# Patient Record
Sex: MALE | Race: BLACK OR AFRICAN AMERICAN | NOT HISPANIC OR LATINO | URBAN - METROPOLITAN AREA
[De-identification: names, ages, dates, MRNs, and addresses within clinical notes are randomized per-mention and may not be internally consistent; named-entity substitution may affect disease eponyms.]

---

## 2023-11-22 ENCOUNTER — OFFICE (OUTPATIENT)
Dept: URBAN - METROPOLITAN AREA CLINIC 26 | Facility: CLINIC | Age: 43
End: 2023-11-22

## 2024-03-14 ENCOUNTER — OFFICE VISIT (OUTPATIENT)
Dept: GASTROENTEROLOGY | Facility: CLINIC | Age: 44
End: 2024-03-14
Payer: COMMERCIAL

## 2024-03-14 VITALS
SYSTOLIC BLOOD PRESSURE: 125 MMHG | HEART RATE: 77 BPM | BODY MASS INDEX: 31.14 KG/M2 | HEIGHT: 70 IN | TEMPERATURE: 98.2 F | DIASTOLIC BLOOD PRESSURE: 78 MMHG | WEIGHT: 217.5 LBS

## 2024-03-14 DIAGNOSIS — B19.10 UNSPECIFIED VIRAL HEPATITIS B WITHOUT HEPATIC COMA: ICD-10-CM

## 2024-03-14 DIAGNOSIS — B18.1 CHRONIC HEPATITIS B VIRUS INFECTION (MULTI): Primary | ICD-10-CM

## 2024-03-14 DIAGNOSIS — Z76.89 ENCOUNTER TO ESTABLISH CARE WITH NEW DOCTOR: ICD-10-CM

## 2024-03-14 PROCEDURE — 99214 OFFICE O/P EST MOD 30 MIN: CPT | Performed by: INTERNAL MEDICINE

## 2024-03-14 PROCEDURE — 1036F TOBACCO NON-USER: CPT | Performed by: INTERNAL MEDICINE

## 2024-03-14 PROCEDURE — 99204 OFFICE O/P NEW MOD 45 MIN: CPT | Performed by: INTERNAL MEDICINE

## 2024-03-14 RX ORDER — OMEPRAZOLE 20 MG/1
20 CAPSULE, DELAYED RELEASE ORAL
COMMUNITY

## 2024-03-14 RX ORDER — CETIRIZINE HYDROCHLORIDE 10 MG/1
TABLET ORAL
COMMUNITY

## 2024-03-14 ASSESSMENT — PAIN SCALES - GENERAL: PAINLEVEL: 0-NO PAIN

## 2024-03-14 NOTE — PATIENT INSTRUCTIONS
Welcome to Dr. [facility]el Lina[facility].  Dr. Linares sees patients at the following sites:  [place] [address] Liver/GI Clinic at [facility] [address], [address] at [facility] at [facility], [facility] [address]    Dr. Linares's hepatology care coordinator, Elsa LO, can be reached at [phone].  Dr. Linares's , Clara Hutchinson, can be reached at [phone].    Lets try to send prior records     Labwork - hepatitis B tests    Liver US    Fibroscan

## 2024-03-14 NOTE — PROGRESS NOTES
Department of Gastroenterology and Hepatology   Clinic Note (NEW patient)     HPI  Tyrone Andino is a 43 y.o. male with PMH of hepatitis B (Dx ~20 year ago) who presents to liver clinic today for hepatitis B.     Patient states he immig[place] from [facility] [place] to the [place] at age of 20. Patient just removed from [facility] to [place] about 3 years ago.     Patient states he tested positive for hepatitis B in [place], [place] about 20 years ago. He was told that due to low DNA level and normal LFTs, he did not need Hep B treatment; he also recalls being tested for genotype and was told it is a low risk strain. At one point he was diagnosed with fatty liver and improved after dietary changes.     He moved to [facility] in 2011 and was followed by a hepatologist in [place]; he states his last FibroScan was about 3 years ago and was told it was normal.     He has not seen a hepatologist the past 2-3 years since moving back to [place]. Patient is asymptomatic from liver standpoint--he denies nausea, vomiting, scleral icterus, abdominal distention/pain, overt GI bleeding.    Labs 2/2024; WBC 7.1, hemoglobin 14.9 with MCV 71 and high eosinophil% (pending hematology appointment), platelet 292, platelet 292, AST 28, ALT 31, alk phos 58, T. bili 0.5, albumin 4.5.    Patient lives with his wife and 4 children; he states all of them were immunized for hep B.  He denies any known family history of hep B infection.    Prior GI procedures (no reports available for review)   Colonoscopy 2016/2017 in [place] for rectal bleeding and was told to repeat in 10 years.   EGD 2015 in [place] for acid reflux: Hiatal hernia.     ROS: All 10 systems reviewed are negative unless mentioned in HPI.     PMH: As above  PSH: No surgery   FH: No liver dx or CRC  SH:   ETOH: No  Tobacco: No  Illicits: No  High risk sexual behaviors: No   Tattoos/piercings: No   Blood transfusion: No  Occupation: A pharmacist at [employer]  "  ; live with wife (also a pharmacist) and 4 children.    Home meds:   Current Outpatient Medications   Medication Sig Dispense Refill    cholecalciferol, vitamin D3, (D3-2000 ORAL) Take by mouth.      ferrous fumarate-vitamin C (Magdiel-Sequeles 65-25) Take 1 tablet by mouth 3 times a day with meals. Do not crush, chew, or split.      cetirizine (ZyrTEC) 10 mg tablet Take by mouth.      omeprazole (PriLOSEC) 20 mg DR capsule Take 1 capsule (20 mg) by mouth.       No current facility-administered medications for this visit.     Allergies: No Known Allergies     VS: /78   Pulse 77   Temp 36.8 °C (98.2 °F) (Temporal)   Ht 1.778 m (5' 10\")   Wt 98.7 kg (217 lb 8 oz)   BMI 31.21 kg/m²     PE:  GEN: Sitting in chair comfortably, NAD   NEURO: A&O x3   HEENT: No scleral icterus   CV: RRR, no obvious m/r/g   PULM: LCTA, no wheezing/rhonchi/crackles   ABD: Soft, NT, ND, +BS   : No suprapubic/CVA tenderness   EXT: No edema in LES         IMAGING:   No results found.     A&P:   Tyrone Andino is a 43 y.o. male ( with PMH of hepatitis B (Dx ~20 year ago) who presents to liver clinic today for hepatitis B. Patient is an immigrant from [facility] ([place] to the  at age of 20.  He reports tested positive for hep B in [place], [place] about 20 years ago and has not required treatment due to low DNA level and normal LFTs.  Moved to [facility] 2 to 3 years ago from [facility]. Patient is asymptomatic from liver standpoint and recent labs 2/2024 showed normal LFTs. He report all household members have been vaccinate for Hep B.     #Hepatitis B   -Will obtain viral hepatitis serology (hep A total antibody, hep B surface antigen&antibody, hep B core antibody, hep B e antigen& antibody, hep B DNA level, and hep C antibody) and AFP  -Will also obtain liver ultrasound and FibroScan  -Meantime we will try to obtain records from [facility] in 6 months (order placed).   Staffed with Dr. Jamel Linares "     Giovanna Sanchez MD (GI fellow, PGY 6)

## 2024-03-14 NOTE — LETTER
March 18, 2024     Oliver Bedoya MD  [address]  [facility]  [address] [address]    Patient: Tyrone Andino   YOB: 1980   Date of Visit: 3/14/2024       Dear Dr. Oliver Bedoya MD:    Thank you for referring Tyrone Andino to me for evaluation. Below are my notes for this consultation.  If you have questions, please do not hesitate to call me. I look forward to following your patient along with you.       Sincerely,     Jamel Linares MD      CC: No Recipients  ______________________________________________________________________________________    Department of Gastroenterology and Hepatology   Clinic Note (NEW patient)     GURWINDER Andino is a 43 y.o. male with PMH of hepatitis B (Dx ~20 year ago) who presents to liver clinic today for hepatitis B.     Patient states he immig[place] from [facility] [place] to the [place] at age of 20. Patient just removed from [facility] to [place] about 3 years ago.     Patient states he tested positive for hepatitis B in [place], [place] about 20 years ago. He was told that due to low DNA level and normal LFTs, he did not need Hep B treatment; he also recalls being tested for genotype and was told it is a low risk strain. At one point he was diagnosed with fatty liver and improved after dietary changes.     He moved to [facility] in 2011 and was followed by a hepatologist in [place]; he states his last FibroScan was about 3 years ago and was told it was normal.     He has not seen a hepatologist the past 2-3 years since moving back to [place]. Patient is asymptomatic from liver standpoint--he denies nausea, vomiting, scleral icterus, abdominal distention/pain, overt GI bleeding.    Labs 2/2024; WBC 7.1, hemoglobin 14.9 with MCV 71 and high eosinophil% (pending hematology appointment), platelet 292, platelet 292, AST 28, ALT 31, alk phos 58, T. bili 0.5, albumin 4.5.    Patient lives with his  "wife and 4 children; he states all of them were immunized for hep B.  He denies any known family history of hep B infection.    Prior GI procedures (no reports available for review)   Colonoscopy 2016/2017 in [place] for rectal bleeding and was told to repeat in 10 years.   EGD 2015 in [place] for acid reflux: Hiatal hernia.     ROS: All 10 systems reviewed are negative unless mentioned in HPI.     PMH: As above  PSH: No surgery   FH: No liver dx or CRC  SH:   ETOH: No  Tobacco: No  Illicits: No  High risk sexual behaviors: No   Tattoos/piercings: No   Blood transfusion: No  Occupation: A pharmacist at [employer]   ; live with wife (also a pharmacist) and 4 children.    Home meds:   Current Outpatient Medications   Medication Sig Dispense Refill   • cholecalciferol, vitamin D3, (D3-2000 ORAL) Take by mouth.     • ferrous fumarate-vitamin C (Magdiel-Sequeles 65-25) Take 1 tablet by mouth 3 times a day with meals. Do not crush, chew, or split.     • cetirizine (ZyrTEC) 10 mg tablet Take by mouth.     • omeprazole (PriLOSEC) 20 mg DR capsule Take 1 capsule (20 mg) by mouth.       No current facility-administered medications for this visit.     Allergies: No Known Allergies     VS: /78   Pulse 77   Temp 36.8 °C (98.2 °F) (Temporal)   Ht 1.778 m (5' 10\")   Wt 98.7 kg (217 lb 8 oz)   BMI 31.21 kg/m²     PE:  GEN: Sitting in chair comfortably, NAD   NEURO: A&O x3   HEENT: No scleral icterus   CV: RRR, no obvious m/r/g   PULM: LCTA, no wheezing/rhonchi/crackles   ABD: Soft, NT, ND, +BS   : No suprapubic/CVA tenderness   EXT: No edema in LES         IMAGING:   No results found.     A&P:   Tyrone Andino is a 43 y.o. male ( with PMH of hepatitis B (Dx ~20 year ago) who presents to liver clinic today for hepatitis B. Patient is an immigrant from [facility] [place] at age of 20.  He reports tested positive for hep B in [place], [place] about 20 years ago and has not required treatment due to low DNA level " and normal LFTs.  Moved to [facility] 2 to 3 years ago from [facility]. Patient is asymptomatic from liver standpoint and recent labs 2/2024 showed normal LFTs. He report all household members have been vaccinate for Hep B.     #Hepatitis B   -Will obtain viral hepatitis serology (hep A total antibody, hep B surface antigen&antibody, hep B core antibody, hep B e antigen& antibody, hep B DNA level, and hep C antibody) and AFP  -Will also obtain liver ultrasound and FibroScan  -Meantime we will try to obtain records from [facility] [place] [facility] in 6 months (order placed).   Staffed with Dr. Jamel Sanchez MD (GI fellow, PGY 6)         Attestation signed by Jamel Linares MD at 3/18/2024  7:26 AM:  I saw and evaluated the patient. I personally obtained the key and critical portions of the history and physical exam or was physically present for key and critical portions performed by the fellow. I reviewed the fellow's documentation and discussed the patient with the fellow. I agree with the fellow's medical decision making as documented in the note.    The patient is here to establish his hepatitis B care. Will obtain his outside records and he will update labwork. Will continue HCC screening with Liver US + AFP every 6m.    Jamel Linares MD

## 2024-04-15 ENCOUNTER — APPOINTMENT (OUTPATIENT)
Dept: RADIOLOGY | Facility: CLINIC | Age: 44
End: 2024-04-15
Payer: COMMERCIAL

## 2024-04-17 ENCOUNTER — APPOINTMENT (OUTPATIENT)
Dept: RADIOLOGY | Facility: CLINIC | Age: 44
End: 2024-04-17
Payer: COMMERCIAL

## 2024-04-22 ENCOUNTER — HOSPITAL ENCOUNTER (OUTPATIENT)
Dept: RADIOLOGY | Facility: CLINIC | Age: 44
Discharge: HOME | End: 2024-04-22
Payer: COMMERCIAL

## 2024-04-22 DIAGNOSIS — B19.10 UNSPECIFIED VIRAL HEPATITIS B WITHOUT HEPATIC COMA: ICD-10-CM

## 2024-04-22 PROCEDURE — 76705 ECHO EXAM OF ABDOMEN: CPT

## 2024-04-22 PROCEDURE — 76705 ECHO EXAM OF ABDOMEN: CPT | Performed by: RADIOLOGY

## 2024-05-03 NOTE — RESULT ENCOUNTER NOTE
Please call patient - Hepatitis B level remains low, liver function is normal.  Liver imaging is normal.  Fibroscan from 2020 -reviewed. Low liver stiffness.  I would recommend repeat MELD, Hep B S ag, HBV DNA, AFP, liver US and Fibroscan in 6 months with office follow up.

## 2024-05-20 DIAGNOSIS — B18.1 CHRONIC HEPATITIS B VIRUS INFECTION (MULTI): ICD-10-CM

## 2024-05-23 ENCOUNTER — APPOINTMENT (OUTPATIENT)
Dept: GASTROENTEROLOGY | Facility: CLINIC | Age: 44
End: 2024-05-23
Payer: COMMERCIAL

## 2024-11-20 ENCOUNTER — HOSPITAL ENCOUNTER (OUTPATIENT)
Dept: RADIOLOGY | Facility: CLINIC | Age: 44
Discharge: HOME | End: 2024-11-20
Payer: COMMERCIAL

## 2024-11-20 DIAGNOSIS — B18.1 CHRONIC HEPATITIS B VIRUS INFECTION (MULTI): ICD-10-CM

## 2024-11-20 PROCEDURE — 76705 ECHO EXAM OF ABDOMEN: CPT

## 2024-11-20 PROCEDURE — 76705 ECHO EXAM OF ABDOMEN: CPT | Performed by: STUDENT IN AN ORGANIZED HEALTH CARE EDUCATION/TRAINING PROGRAM

## 2024-11-25 ENCOUNTER — CLINICAL SUPPORT (OUTPATIENT)
Dept: GASTROENTEROLOGY | Facility: CLINIC | Age: 44
End: 2024-11-25
Payer: COMMERCIAL

## 2024-11-25 DIAGNOSIS — B18.1 CHRONIC HEPATITIS B VIRUS INFECTION (MULTI): ICD-10-CM

## 2024-11-25 PROCEDURE — 91200 LIVER ELASTOGRAPHY: CPT | Performed by: INTERNAL MEDICINE

## 2024-11-26 NOTE — PROGRESS NOTES
Results:  E (median liver stiffness measurement):   5.2   kPa  CAP (controlled attenuation parameter):  209   dB/m    Interpretation:  This was a technically adequate study. The Fibrosis score is consistent with Metavir F0. The CAP score is consistent with 0 - 5% hepatocyte steatosis (steatosis grade 0 of 3) .    Jamel Linares MD  Hepatology

## 2024-12-13 ENCOUNTER — OFFICE (OUTPATIENT)
Dept: URBAN - METROPOLITAN AREA CLINIC 27 | Facility: CLINIC | Age: 44
End: 2024-12-13
Payer: COMMERCIAL

## 2024-12-13 VITALS
DIASTOLIC BLOOD PRESSURE: 94 MMHG | HEIGHT: 70 IN | HEART RATE: 83 BPM | SYSTOLIC BLOOD PRESSURE: 153 MMHG | SYSTOLIC BLOOD PRESSURE: 138 MMHG | TEMPERATURE: 98.1 F | DIASTOLIC BLOOD PRESSURE: 92 MMHG | WEIGHT: 192 LBS

## 2024-12-13 DIAGNOSIS — R13.10 DYSPHAGIA, UNSPECIFIED: ICD-10-CM

## 2024-12-13 DIAGNOSIS — Z86.19 PERSONAL HISTORY OF OTHER INFECTIOUS AND PARASITIC DISEASES: ICD-10-CM

## 2024-12-13 DIAGNOSIS — R10.13 EPIGASTRIC PAIN: ICD-10-CM

## 2024-12-13 DIAGNOSIS — K21.9 GASTRO-ESOPHAGEAL REFLUX DISEASE WITHOUT ESOPHAGITIS: ICD-10-CM

## 2024-12-13 DIAGNOSIS — R06.6 HICCOUGH: ICD-10-CM

## 2024-12-13 DIAGNOSIS — R14.0 ABDOMINAL DISTENSION (GASEOUS): ICD-10-CM

## 2024-12-13 DIAGNOSIS — R74.01 ELEVATION OF LEVELS OF LIVER TRANSAMINASE LEVELS: ICD-10-CM

## 2024-12-13 DIAGNOSIS — B18.1 CHRONIC VIRAL HEPATITIS B WITHOUT DELTA-AGENT: ICD-10-CM

## 2024-12-13 DIAGNOSIS — Z12.11 ENCOUNTER FOR SCREENING FOR MALIGNANT NEOPLASM OF COLON: ICD-10-CM

## 2024-12-13 PROCEDURE — 99214 OFFICE O/P EST MOD 30 MIN: CPT | Performed by: CLINICAL NURSE SPECIALIST

## 2024-12-13 RX ORDER — ONDANSETRON HYDROCHLORIDE 4 MG/1
16 TABLET, FILM COATED ORAL
Qty: 3 | Refills: 0 | Status: ACTIVE
Start: 2024-12-13

## 2025-01-08 VITALS
SYSTOLIC BLOOD PRESSURE: 109 MMHG | HEART RATE: 107 BPM | DIASTOLIC BLOOD PRESSURE: 105 MMHG | SYSTOLIC BLOOD PRESSURE: 139 MMHG | SYSTOLIC BLOOD PRESSURE: 111 MMHG | SYSTOLIC BLOOD PRESSURE: 139 MMHG | HEART RATE: 87 BPM | DIASTOLIC BLOOD PRESSURE: 67 MMHG | DIASTOLIC BLOOD PRESSURE: 105 MMHG | DIASTOLIC BLOOD PRESSURE: 105 MMHG | RESPIRATION RATE: 17 BRPM | DIASTOLIC BLOOD PRESSURE: 67 MMHG | RESPIRATION RATE: 16 BRPM | HEART RATE: 74 BPM | SYSTOLIC BLOOD PRESSURE: 103 MMHG | HEIGHT: 70 IN | HEART RATE: 74 BPM | DIASTOLIC BLOOD PRESSURE: 79 MMHG | SYSTOLIC BLOOD PRESSURE: 107 MMHG | DIASTOLIC BLOOD PRESSURE: 79 MMHG | OXYGEN SATURATION: 100 % | OXYGEN SATURATION: 99 % | DIASTOLIC BLOOD PRESSURE: 74 MMHG | DIASTOLIC BLOOD PRESSURE: 77 MMHG | HEART RATE: 81 BPM | DIASTOLIC BLOOD PRESSURE: 67 MMHG | SYSTOLIC BLOOD PRESSURE: 107 MMHG | HEART RATE: 107 BPM | TEMPERATURE: 98.4 F | SYSTOLIC BLOOD PRESSURE: 109 MMHG | SYSTOLIC BLOOD PRESSURE: 101 MMHG | RESPIRATION RATE: 10 BRPM | DIASTOLIC BLOOD PRESSURE: 79 MMHG | HEART RATE: 87 BPM | SYSTOLIC BLOOD PRESSURE: 101 MMHG | RESPIRATION RATE: 15 BRPM | HEART RATE: 81 BPM | SYSTOLIC BLOOD PRESSURE: 101 MMHG | SYSTOLIC BLOOD PRESSURE: 117 MMHG | WEIGHT: 193 LBS | HEART RATE: 98 BPM | SYSTOLIC BLOOD PRESSURE: 124 MMHG | HEART RATE: 87 BPM | RESPIRATION RATE: 18 BRPM | RESPIRATION RATE: 15 BRPM | SYSTOLIC BLOOD PRESSURE: 111 MMHG | RESPIRATION RATE: 17 BRPM | HEART RATE: 102 BPM | HEART RATE: 81 BPM | OXYGEN SATURATION: 99 % | OXYGEN SATURATION: 100 % | OXYGEN SATURATION: 100 % | RESPIRATION RATE: 17 BRPM | HEART RATE: 98 BPM | DIASTOLIC BLOOD PRESSURE: 68 MMHG | DIASTOLIC BLOOD PRESSURE: 77 MMHG | RESPIRATION RATE: 16 BRPM | HEART RATE: 98 BPM | DIASTOLIC BLOOD PRESSURE: 74 MMHG | DIASTOLIC BLOOD PRESSURE: 77 MMHG | HEART RATE: 102 BPM | DIASTOLIC BLOOD PRESSURE: 90 MMHG | SYSTOLIC BLOOD PRESSURE: 111 MMHG | RESPIRATION RATE: 16 BRPM | RESPIRATION RATE: 10 BRPM | TEMPERATURE: 98.4 F | SYSTOLIC BLOOD PRESSURE: 117 MMHG | HEART RATE: 102 BPM | DIASTOLIC BLOOD PRESSURE: 68 MMHG | WEIGHT: 193 LBS | SYSTOLIC BLOOD PRESSURE: 103 MMHG | SYSTOLIC BLOOD PRESSURE: 117 MMHG | SYSTOLIC BLOOD PRESSURE: 103 MMHG | DIASTOLIC BLOOD PRESSURE: 74 MMHG | DIASTOLIC BLOOD PRESSURE: 90 MMHG | HEIGHT: 70 IN | RESPIRATION RATE: 19 BRPM | WEIGHT: 193 LBS | SYSTOLIC BLOOD PRESSURE: 107 MMHG | RESPIRATION RATE: 19 BRPM | DIASTOLIC BLOOD PRESSURE: 90 MMHG | RESPIRATION RATE: 10 BRPM | OXYGEN SATURATION: 99 % | DIASTOLIC BLOOD PRESSURE: 68 MMHG | SYSTOLIC BLOOD PRESSURE: 139 MMHG | RESPIRATION RATE: 19 BRPM | SYSTOLIC BLOOD PRESSURE: 109 MMHG | RESPIRATION RATE: 15 BRPM | RESPIRATION RATE: 18 BRPM | SYSTOLIC BLOOD PRESSURE: 124 MMHG | SYSTOLIC BLOOD PRESSURE: 124 MMHG | HEART RATE: 74 BPM | TEMPERATURE: 98.4 F | RESPIRATION RATE: 18 BRPM | HEART RATE: 107 BPM | HEIGHT: 70 IN

## 2025-01-16 ENCOUNTER — OFFICE (OUTPATIENT)
Dept: URBAN - METROPOLITAN AREA PATHOLOGY 2 | Facility: PATHOLOGY | Age: 45
End: 2025-01-16
Payer: COMMERCIAL

## 2025-01-16 ENCOUNTER — AMBULATORY SURGICAL CENTER (OUTPATIENT)
Dept: URBAN - METROPOLITAN AREA SURGERY 12 | Facility: SURGERY | Age: 45
End: 2025-01-16
Payer: COMMERCIAL

## 2025-01-16 DIAGNOSIS — K22.89 OTHER SPECIFIED DISEASE OF ESOPHAGUS: ICD-10-CM

## 2025-01-16 DIAGNOSIS — K21.9 GASTRO-ESOPHAGEAL REFLUX DISEASE WITHOUT ESOPHAGITIS: ICD-10-CM

## 2025-01-16 DIAGNOSIS — R10.13 EPIGASTRIC PAIN: ICD-10-CM

## 2025-01-16 DIAGNOSIS — K44.9 DIAPHRAGMATIC HERNIA WITHOUT OBSTRUCTION OR GANGRENE: ICD-10-CM

## 2025-01-16 DIAGNOSIS — K29.70 GASTRITIS, UNSPECIFIED, WITHOUT BLEEDING: ICD-10-CM

## 2025-01-16 DIAGNOSIS — R13.10 DYSPHAGIA, UNSPECIFIED: ICD-10-CM

## 2025-01-16 DIAGNOSIS — K31.A0 GASTRIC INTESTINAL METAPLASIA, UNSPECIFIED: ICD-10-CM

## 2025-01-16 PROCEDURE — 88342 IMHCHEM/IMCYTCHM 1ST ANTB: CPT | Performed by: PATHOLOGY

## 2025-01-16 PROCEDURE — 88305 TISSUE EXAM BY PATHOLOGIST: CPT | Performed by: PATHOLOGY

## 2025-01-16 PROCEDURE — 43239 EGD BIOPSY SINGLE/MULTIPLE: CPT | Performed by: INTERNAL MEDICINE

## 2025-01-16 PROCEDURE — 43450 DILATE ESOPHAGUS 1/MULT PASS: CPT | Performed by: INTERNAL MEDICINE

## 2025-01-16 PROCEDURE — 88313 SPECIAL STAINS GROUP 2: CPT | Performed by: PATHOLOGY

## 2025-04-03 ENCOUNTER — APPOINTMENT (OUTPATIENT)
Dept: GASTROENTEROLOGY | Facility: CLINIC | Age: 45
End: 2025-04-03
Payer: COMMERCIAL

## 2025-04-10 ENCOUNTER — OFFICE VISIT (OUTPATIENT)
Dept: GASTROENTEROLOGY | Facility: CLINIC | Age: 45
End: 2025-04-10
Payer: COMMERCIAL

## 2025-04-10 VITALS
BODY MASS INDEX: 27.49 KG/M2 | SYSTOLIC BLOOD PRESSURE: 114 MMHG | TEMPERATURE: 97.6 F | HEIGHT: 70 IN | WEIGHT: 192 LBS | DIASTOLIC BLOOD PRESSURE: 91 MMHG | HEART RATE: 73 BPM

## 2025-04-10 DIAGNOSIS — B18.1 CHRONIC VIRAL HEPATITIS B WITHOUT DELTA AGENT AND WITHOUT COMA (MULTI): ICD-10-CM

## 2025-04-10 DIAGNOSIS — B18.1 CHRONIC HEPATITIS B VIRUS INFECTION (MULTI): Primary | ICD-10-CM

## 2025-04-10 DIAGNOSIS — Z71.2 ENCOUNTER TO DISCUSS TEST RESULTS: ICD-10-CM

## 2025-04-10 DIAGNOSIS — K82.4 GALLBLADDER POLYP: ICD-10-CM

## 2025-04-10 PROCEDURE — 99214 OFFICE O/P EST MOD 30 MIN: CPT | Performed by: INTERNAL MEDICINE

## 2025-04-10 PROCEDURE — G2211 COMPLEX E/M VISIT ADD ON: HCPCS | Performed by: INTERNAL MEDICINE

## 2025-04-10 PROCEDURE — 3008F BODY MASS INDEX DOCD: CPT | Performed by: INTERNAL MEDICINE

## 2025-04-10 RX ORDER — ESOMEPRAZOLE MAGNESIUM 40 MG/1
40 CAPSULE, DELAYED RELEASE ORAL
COMMUNITY
Start: 2025-02-21 | End: 2025-05-22

## 2025-04-10 RX ORDER — CYCLOBENZAPRINE HCL 10 MG
TABLET ORAL
COMMUNITY
Start: 2024-05-10

## 2025-04-10 NOTE — LETTER
April 17, 2025     Yolette Roy DO  [address]  [place] [id number]    Patient: Tyrone Andino   YOB: 1980   Date of Visit: 4/10/2025       Dear Dr. Yolette Roy DO:    Thank you for referring Tyrone Andino to me for evaluation. Below are my notes for this consultation.  If you have questions, please do not hesitate to call me. I look forward to following your patient along with you.       Sincerely,     Jamel Linares MD      CC: No Recipients  ______________________________________________________________________________________    Department of Gastroenterology and Hepatology   Clinic Note (Follow up patient)     GURWINDER Andino is a 44 y.o. male with PMH of hepatitis B (Dx ~20 year ago) who presents to liver clinic today for follow up and management of chronic hepatitis B infection.     Patient states he immigrated from [facility] ([place] to the [place] at age of 20. Patient relocated to [place] from [facility] about 3 years ago. I have reviewed the records from his previous Gastroenterologist.    He first tested positive for hepatitis B in [place], [place] about 20 years ago. He was told that due to low DNA level and normal LFTs, that Hep B treatment was not indicated. At one point he was diagnosed with fatty liver which improved after dietary changes.     He first established care in my liver clinic, 1 year ago in March of 2024.     Patient is asymptomatic from liver standpoint--he denies nausea, vomiting, scleral icterus, abdominal distention/pain, overt GI bleeding.    Labs 2/2024; WBC 7.1, hemoglobin 14.9 with MCV 71 and high eosinophil% (pending hematology appointment), platelet 292, platelet 292, AST 28, ALT 31, alk phos 58, T. bili 0.5, albumin 4.5.    Patient lives with his wife and 4 children; he states all of them were immunized for hep B.  He denies any known family history of hep B infection.    Prior GI procedures (no reports  "available for review)   Colonoscopy 2016/2017 in [place] for rectal bleeding and was told to repeat in 10 years.   EGD 2015 in [place] for acid reflux: Hiatal hernia.     ROS: All 10 systems reviewed are negative unless mentioned in HPI.     PMH: As above  PSH: No surgery   FH: No liver dx or CRC  SH:   ETOH: No  Tobacco: No  Illicits: No  High risk sexual behaviors: No   Tattoos/piercings: No   Blood transfusion: No  Occupation: A pharmacist at [employer]   ; live with wife (also a pharmacist) and 4 children.    Home meds:   Current Outpatient Medications   Medication Sig Dispense Refill   • cetirizine (ZyrTEC) 10 mg tablet Take by mouth.     • cholecalciferol, vitamin D3, (D3-2000 ORAL) Take by mouth.     • esomeprazole (NexIUM) 40 mg DR capsule Take 1 capsule (40 mg) by mouth.     • ferrous fumarate-vitamin C (Magdiel-Sequeles 65-25) Take 1 tablet by mouth 3 times daily (morning, midday, late afternoon). Do not crush, chew, or split.     • cyclobenzaprine (Flexeril) 10 mg tablet TAKE ONE TABLET BY MOUTH NIGHLTY AS NEEDED FOR MUSCLE SPASMS FOR UP TO 10 DAYS (Patient not taking: Reported on 4/10/2025)     • omeprazole (PriLOSEC) 20 mg DR capsule Take 1 capsule (20 mg) by mouth. (Patient not taking: Reported on 4/10/2025)       No current facility-administered medications for this visit.     Allergies: No Known Allergies     VS: BP (!) 114/91   Pulse 73   Temp 36.4 °C (97.6 °F) (Temporal)   Ht 1.778 m (5' 10\")   Wt 87.1 kg (192 lb)   BMI 27.55 kg/m²     PE:  GEN: Sitting in chair comfortably, NAD   NEURO: A&O x3   HEENT: No scleral icterus   CV: RRR, no obvious m/r/g   PULM: LCTA, no wheezing/rhonchi/crackles   ABD: Soft, NT, ND, +BS   : No suprapubic/CVA tenderness   EXT: No edema in LES     LABS    4/8/2025  SODIUM  136 - 145 mmol/L 140   POTASSIUM  3.5 - 5.1 mmol/L 3.9   Comment: Plasma potassium values may be up to 0.5 mmol/L lower than serum values.   CHLORIDE  98 - 107 mmol/L 111 High    CARBON " DIOXIDE  22 - 29 mmol/L 22   ANION GAP  3 - 13 mmol/L 7   UREA NITROGEN  8 - 21 mg/dL 15   CREATININE  0.72 - 1.25 mg/dL 1.26 High    GLUCOSE  74 - 100 mg/dL 85   CALCIUM  8.4 - 10.2 mg/dL 9.6   AST (SGOT)  <34 U/L 27   ALT  <40 U/L 30   ALKALINE PHOSPHATASE  40 - 150 U/L 72   ALBUMIN  3.5 - 5.0 g/dL 4.2   BILIRUBIN, TOTAL  <1.2 mg/dL 0.6   TOTAL PROTEIN  6.4 - 8.3 g/dL 7.9   eGFR  >60.0 mL/min/1.73m*2 71.2     12/13/2024  SODIUM  136 - 145 mmol/L 140   POTASSIUM  3.5 - 5.1 mmol/L 4.2   Comment: Plasma potassium values may be up to 0.5 mmol/L lower than serum values.   CHLORIDE  98 - 107 mmol/L 106   CARBON DIOXIDE  22 - 29 mmol/L 26   ANION GAP  3 - 13 mmol/L 8   UREA NITROGEN  8 - 21 mg/dL 13   CREATININE  0.72 - 1.25 mg/dL 1.47 High    GLUCOSE  74 - 100 mg/dL 85   CALCIUM  8.4 - 10.2 mg/dL 10.3 High    AST (SGOT)  <34 U/L 30   ALT  <40 U/L 45 High    ALKALINE PHOSPHATASE  40 - 150 U/L 68   ALBUMIN  3.5 - 5.0 g/dL 4.5   BILIRUBIN, TOTAL  <1.2 mg/dL 0.9   TOTAL PROTEIN  6.4 - 8.3 g/dL 8.7 High    eGFR  >60.0 mL/min/1.73m*2 59.6 Low      5/13/2024  SODIUM  135 - 145 mmol/L 139   POTASSIUM  3.5 - 5.1 mmol/L 3.7   CHLORIDE  98 - 107 mmol/L 110 High    CARBON DIOXIDE  22 - 30 mmol/L 25   ANION GAP  3 - 13 mmol/L 4   UREA NITROGEN  9 - 20 mg/dL 15   CREATININE  0.66 - 1.25 mg/dL 1.35 High    GLUCOSE  70 - 100 mg/dL 84   CALCIUM  8.4 - 10.4 mg/dL 9.3   AST (SGOT)  15 - 46 U/L 57 High    ALT  0 - 49 U/L 103 High    ALKALINE PHOSPHATASE  38 - 126 U/L 58   ALBUMIN  3.5 - 5.0 g/dL 4.4   BILIRUBIN, TOTAL  0.2 - 1.3 mg/dL 0.8   TOTAL PROTEIN  6.3 - 8.2 g/dL 8.2   eGFR  >60.0 mL/min/1.73m*2 66       HBV Viral Kinetics  1/8/2025  HEPATITIS B VIRUS DNA  IU/mL 2,150 High      4/15/2024  Comment: HBV Qnt by NAAT (IU/mL) 1,167 IU/mL  HBV Qnt by NAAT (log IU/mL) 3.07 log IU/mL     4/30/2021  2770 IU/mL    11/13/2020  7320 IU/mL    FIBROSCAN    9/2020   LSM 6.6 kPa /     11/26/2024:  E (median liver stiffness measurement):    5.2   kPa   CAP (controlled attenuation parameter):  209   dB/m     [id number]  1. Heterogenous hepatic echotexture could relate to underlying  diffuse hepatocellular dysfunction, otherwise nonspecific. Advise  clinical and biochemical correlation  2. Tiny gallbladder polyp measuring 0.3 cm likely benign.     A&P:   Chronic Hepatitis B infection, e antigen negative: with low viral load.  Based on his current viral load and liver enzymes he is somewhere on the spectrum between the replication phase to the immune active phase of Hepatitits B infection. His liver enzymes have been mildly elevated 2 of the last 3 times they have been checked. His viral load has fluctuated between 1167 and 7320 over the past 5 years.    In terms of fibrosis - low liver stiffness.     Other considerations: from [place], likely vertical/childhood transmission.     There is a small GB polyp on imaging.    He reports all household members have been vaccinate for Hep B.     Plan/Recommendations:    The main question here is whether to treat his HBV infection.  I have discussed the pros and cons for treatment at this time. We decided we will monitor his labwork more closely this year: every 3-4 months, and defer a decision at this time. Should his LFTs remain elevated, I would probably favor treatment. Shouldhis HBV DNA level rise, that would be another consideration to favor treatment.     liver ultrasound and AFP for HCC screening (next month).  FibroScan: annually (Oct 2025).    Follow up in Liver Clinic: October.    Jamel Linares MD  Hepatology    Jamel Linares MD (GI fellow, PGY 6)

## 2025-04-10 NOTE — PATIENT INSTRUCTIONS
"Welcome to Dr. Jamel Linares's Liver Clinic.  Dr. Linares sees patients at the following sites:  [place] [address] Liver/GI Clinic at [facility]  [address], [address] at [facility] at [facility], [facility] [address]    Dr. Linares's hepatology care coordinator, Elsa LO, can be reached at [phone].  Dr. Linares's , Clara Hutchinson, can be reached at [phone].     Get blood work in 1 month.     Get blood work again in early October 2025.    Get a Liver Ultrasound in 1 month.  Do not eat or drink anything 6 hours prior to your exam.  Call [phone] to schedule.    Get a Fibroscan of your Liver early October 2025.  Do not eat or drink anything 3 hours prior to your exam.   Schedule on the way out from your visit today, or call [phone].  When calling and after prompts, state \"make an appointment,\" and then \"gastro\" to get to the appropriate .    Follow up with me in clinic in Mid October 2025. Get labs and Fibroscan completed prior to visit.  Schedule your visit on your way out today. If unable, please call [phone] to schedule.  "

## 2025-04-10 NOTE — PROGRESS NOTES
Department of Gastroenterology and Hepatology   Clinic Note (Follow up patient)     HPI  Tyrone Andino is a 44 y.o. male with PMH of hepatitis B (Dx ~20 year ago) who presents to liver clinic today for follow up and management of chronic hepatitis B infection.     Patient states he immigrated from [facility] ([place] to the [place] at age of 20. Patient relocated to [place] from [facility] about 3 years ago. I have reviewed the records from his previous Gastroenterologist.    He first tested positive for hepatitis B in [place], [place] about 20 years ago. He was told that due to low DNA level and normal LFTs, that Hep B treatment was not indicated. At one point he was diagnosed with fatty liver which improved after dietary changes.     He first established care in my liver clinic, 1 year ago in March of 2024.     Patient is asymptomatic from liver standpoint--he denies nausea, vomiting, scleral icterus, abdominal distention/pain, overt GI bleeding.    Labs 2/2024; WBC 7.1, hemoglobin 14.9 with MCV 71 and high eosinophil% (pending hematology appointment), platelet 292, platelet 292, AST 28, ALT 31, alk phos 58, T. bili 0.5, albumin 4.5.    Patient lives with his wife and 4 children; he states all of them were immunized for hep B.  He denies any known family history of hep B infection.    Prior GI procedures (no reports available for review)   Colonoscopy 2016/2017 in [place] for rectal bleeding and was told to repeat in 10 years.   EGD 2015 in [place] for acid reflux: Hiatal hernia.     ROS: All 10 systems reviewed are negative unless mentioned in HPI.     PMH: As above  PSH: No surgery   FH: No liver dx or CRC  SH:   ETOH: No  Tobacco: No  Illicits: No  High risk sexual behaviors: No   Tattoos/piercings: No   Blood transfusion: No  Occupation: A pharmacist at [employer]   ; live with wife (also a pharmacist) and 4 children.    Home meds:   Current Outpatient Medications   Medication Sig Dispense Refill  "   cetirizine (ZyrTEC) 10 mg tablet Take by mouth.      cholecalciferol, vitamin D3, (D3-2000 ORAL) Take by mouth.      esomeprazole (NexIUM) 40 mg DR capsule Take 1 capsule (40 mg) by mouth.      ferrous fumarate-vitamin C (Magdiel-Sequeles 65-25) Take 1 tablet by mouth 3 times daily (morning, midday, late afternoon). Do not crush, chew, or split.      cyclobenzaprine (Flexeril) 10 mg tablet TAKE ONE TABLET BY MOUTH NIGHLTY AS NEEDED FOR MUSCLE SPASMS FOR UP TO 10 DAYS (Patient not taking: Reported on 4/10/2025)      omeprazole (PriLOSEC) 20 mg DR capsule Take 1 capsule (20 mg) by mouth. (Patient not taking: Reported on 4/10/2025)       No current facility-administered medications for this visit.     Allergies: No Known Allergies     VS: BP (!) 114/91   Pulse 73   Temp 36.4 °C (97.6 °F) (Temporal)   Ht 1.778 m (5' 10\")   Wt 87.1 kg (192 lb)   BMI 27.55 kg/m²     PE:  GEN: Sitting in chair comfortably, NAD   NEURO: A&O x3   HEENT: No scleral icterus   CV: RRR, no obvious m/r/g   PULM: LCTA, no wheezing/rhonchi/crackles   ABD: Soft, NT, ND, +BS   : No suprapubic/CVA tenderness   EXT: No edema in LES     LABS    4/8/2025  SODIUM  136 - 145 mmol/L 140   POTASSIUM  3.5 - 5.1 mmol/L 3.9   Comment: Plasma potassium values may be up to 0.5 mmol/L lower than serum values.   CHLORIDE  98 - 107 mmol/L 111 High    CARBON DIOXIDE  22 - 29 mmol/L 22   ANION GAP  3 - 13 mmol/L 7   UREA NITROGEN  8 - 21 mg/dL 15   CREATININE  0.72 - 1.25 mg/dL 1.26 High    GLUCOSE  74 - 100 mg/dL 85   CALCIUM  8.4 - 10.2 mg/dL 9.6   AST (SGOT)  <34 U/L 27   ALT  <40 U/L 30   ALKALINE PHOSPHATASE  40 - 150 U/L 72   ALBUMIN  3.5 - 5.0 g/dL 4.2   BILIRUBIN, TOTAL  <1.2 mg/dL 0.6   TOTAL PROTEIN  6.4 - 8.3 g/dL 7.9   eGFR  >60.0 mL/min/1.73m*2 71.2     12/13/2024  SODIUM  136 - 145 mmol/L 140   POTASSIUM  3.5 - 5.1 mmol/L 4.2   Comment: Plasma potassium values may be up to 0.5 mmol/L lower than serum values.   CHLORIDE  98 - 107 mmol/L 106 "   CARBON DIOXIDE  22 - 29 mmol/L 26   ANION GAP  3 - 13 mmol/L 8   UREA NITROGEN  8 - 21 mg/dL 13   CREATININE  0.72 - 1.25 mg/dL 1.47 High    GLUCOSE  74 - 100 mg/dL 85   CALCIUM  8.4 - 10.2 mg/dL 10.3 High    AST (SGOT)  <34 U/L 30   ALT  <40 U/L 45 High    ALKALINE PHOSPHATASE  40 - 150 U/L 68   ALBUMIN  3.5 - 5.0 g/dL 4.5   BILIRUBIN, TOTAL  <1.2 mg/dL 0.9   TOTAL PROTEIN  6.4 - 8.3 g/dL 8.7 High    eGFR  >60.0 mL/min/1.73m*2 59.6 Low      2024  SODIUM  135 - 145 mmol/L 139   POTASSIUM  3.5 - 5.1 mmol/L 3.7   CHLORIDE  98 - 107 mmol/L 110 High    CARBON DIOXIDE  22 - 30 mmol/L 25   ANION GAP  3 - 13 mmol/L 4   UREA NITROGEN  9 - 20 mg/dL 15   CREATININE  0.66 - 1.25 mg/dL 1.35 High    GLUCOSE  70 - 100 mg/dL 84   CALCIUM  8.4 - 10.4 mg/dL 9.3   AST (SGOT)  15 - 46 U/L 57 High    ALT  0 - 49 U/L 103 High    ALKALINE PHOSPHATASE  38 - 126 U/L 58   ALBUMIN  3.5 - 5.0 g/dL 4.4   BILIRUBIN, TOTAL  0.2 - 1.3 mg/dL 0.8   TOTAL PROTEIN  6.3 - 8.2 g/dL 8.2   eGFR  >60.0 mL/min/1.73m*2 66       HBV Viral Kinetics  2025  HEPATITIS B VIRUS DNA  IU/mL 2,150 High      4/15/2024  Comment: HBV Qnt by NAAT (IU/mL) 1,167 IU/mL  HBV Qnt by NAAT (log IU/mL) 3.07 log IU/mL     2021  2770 IU/mL    2020  7320 IU/mL    FIBROSCAN    2020   LSM 6.6 kPa /     2024:  E (median liver stiffness measurement):   5.2   kPa   CAP (controlled attenuation parameter):  209   dB/m     [id number]  1. Heterogenous hepatic echotexture could relate to underlying  diffuse hepatocellular dysfunction, otherwise nonspecific. Advise  clinical and biochemical correlation  2. Tiny gallbladder polyp measuring 0.3 cm likely benign.     A&P:   Chronic Hepatitis B infection, e antigen negative: with low viral load.  Based on his current viral load and liver enzymes he is somewhere on the spectrum between the replication phase to the immune active phase of Hepatitits B infection. His liver enzymes have been mildly  elevated 2 of the last 3 times they have been checked. His viral load has fluctuated between 1167 and 7320 over the past 5 years.    In terms of fibrosis - low liver stiffness.     Other considerations: from [place], likely vertical/childhood transmission.     There is a small GB polyp on imaging.    He reports all household members have been vaccinate for Hep B.     Plan/Recommendations:    The main question here is whether to treat his HBV infection.  I have discussed the pros and cons for treatment at this time. We decided we will monitor his labwork more closely this year: every 3-4 months, and defer a decision at this time. Should his LFTs remain elevated, I would probably favor treatment. Shouldhis HBV DNA level rise, that would be another consideration to favor treatment.     liver ultrasound and AFP for HCC screening (next month).  FibroScan: annually (Oct 2025).    Follow up in Liver Clinic: October.    Jamel Linares MD  Hepatology    Jamel Linares MD (GI fellow, PGY 6)

## 2025-05-10 DIAGNOSIS — B18.1 CHRONIC HEPATITIS B VIRUS INFECTION (MULTI): ICD-10-CM

## 2025-05-22 ENCOUNTER — APPOINTMENT (OUTPATIENT)
Dept: GASTROENTEROLOGY | Facility: CLINIC | Age: 45
End: 2025-05-22
Payer: COMMERCIAL

## 2025-05-27 ENCOUNTER — HOSPITAL ENCOUNTER (OUTPATIENT)
Dept: RADIOLOGY | Facility: CLINIC | Age: 45
End: 2025-05-27
Payer: COMMERCIAL

## 2025-08-12 ENCOUNTER — APPOINTMENT (OUTPATIENT)
Dept: RADIOLOGY | Facility: CLINIC | Age: 45
End: 2025-08-12
Payer: COMMERCIAL